# Patient Record
Sex: MALE | Race: WHITE | NOT HISPANIC OR LATINO | URBAN - METROPOLITAN AREA
[De-identification: names, ages, dates, MRNs, and addresses within clinical notes are randomized per-mention and may not be internally consistent; named-entity substitution may affect disease eponyms.]

---

## 2021-04-07 ENCOUNTER — IMMUNIZATION (OUTPATIENT)
Dept: VACCINE CLINIC | Facility: HOSPITAL | Age: 41
End: 2021-04-07

## 2021-04-07 PROCEDURE — 0001A: CPT | Performed by: INTERNAL MEDICINE

## 2021-04-07 PROCEDURE — 91300 HC SARSCOV02 VAC 30MCG/0.3ML IM: CPT | Performed by: INTERNAL MEDICINE

## 2021-04-28 ENCOUNTER — IMMUNIZATION (OUTPATIENT)
Dept: VACCINE CLINIC | Facility: HOSPITAL | Age: 41
End: 2021-04-28

## 2021-04-28 PROCEDURE — 91300 HC SARSCOV02 VAC 30MCG/0.3ML IM: CPT | Performed by: INTERNAL MEDICINE

## 2021-04-28 PROCEDURE — 0002A: CPT | Performed by: INTERNAL MEDICINE

## 2023-06-22 PROBLEM — E55.9 VITAMIN D DEFICIENCY: Status: ACTIVE | Noted: 2023-06-22

## 2023-06-22 PROBLEM — D50.8 IRON DEFICIENCY ANEMIA SECONDARY TO INADEQUATE DIETARY IRON INTAKE: Status: ACTIVE | Noted: 2023-06-22

## 2023-06-22 PROBLEM — Z91.09 ENVIRONMENTAL ALLERGIES: Status: ACTIVE | Noted: 2023-06-22

## 2023-06-22 PROBLEM — L71.9 ROSACEA: Status: ACTIVE | Noted: 2023-06-22

## 2023-08-22 ENCOUNTER — OFFICE VISIT (OUTPATIENT)
Dept: SPORTS MEDICINE | Facility: CLINIC | Age: 43
End: 2023-08-22
Payer: COMMERCIAL

## 2023-08-22 VITALS
HEIGHT: 70 IN | WEIGHT: 185.4 LBS | OXYGEN SATURATION: 97 % | RESPIRATION RATE: 12 BRPM | SYSTOLIC BLOOD PRESSURE: 118 MMHG | BODY MASS INDEX: 26.54 KG/M2 | DIASTOLIC BLOOD PRESSURE: 84 MMHG | HEART RATE: 74 BPM

## 2023-08-22 DIAGNOSIS — M25.561 CHRONIC PAIN OF RIGHT KNEE: ICD-10-CM

## 2023-08-22 DIAGNOSIS — G89.29 CHRONIC PAIN OF RIGHT KNEE: ICD-10-CM

## 2023-08-22 DIAGNOSIS — M62.559 ATROPHY OF QUADRICEPS FEMORIS MUSCLE: ICD-10-CM

## 2023-08-22 DIAGNOSIS — M79.604 RIGHT LEG PAIN: Primary | ICD-10-CM

## 2023-08-22 PROCEDURE — 99213 OFFICE O/P EST LOW 20 MIN: CPT | Performed by: FAMILY MEDICINE

## 2023-08-22 NOTE — PROGRESS NOTES
"Lopez is a 42 y.o. year old male     Chief Complaint   Patient presents with    Follow-up     Sciatic and right knee       History of Present Illness  HPI   Here to follow-up on right leg and knee pain.  Overall he feels like he is improving since his last visit with progressive strengthening.  He still has some specific pain around the right knee particularly with some stiffness and pain anteriorly with going down the step left leg first.  He also notes some posterior right knee stiffness and pain at times.  His more proximal thigh pain has nearly resolved.    Review of Systems    /84 (BP Location: Right arm, Patient Position: Sitting, Cuff Size: Adult)   Pulse 74   Resp 12   Ht 177.8 cm (70\")   Wt 84.1 kg (185 lb 6.4 oz)   SpO2 97%   BMI 26.60 kg/mý      Physical Exam    Vital signs reviewed.   General: No acute distress.      MSK Exam:  Ortho Exam  Right leg: Stable appearance.  His quadricep atrophy seems less obvious today.  There is mild patellofemoral tenderness palpation and mild lateral joint line tenderness at the knee.  Normal range of motion normal strength.  There is no ligamentous laxity but he does have some apprehension with Héctor maneuver.  He does have some pain with a dial maneuver as well.      Diagnoses and all orders for this visit:    Right leg pain    Atrophy of quadriceps femoris muscle    Chronic pain of right knee      Overall I think we can continue treating his knee pain and quadricep atrophy with progressive strengthening and stability control therapy.  We discussed some changes to his therapy regimen.  If he fails to continue to respond we could consider advanced imaging to the knee for possible underlying cartilaginous injury as there is some early arthritic change on his patellofemoral space on his x-ray from his last visit.  We could also rule out lateral meniscus pathology.  I did recommend that he check back with his physical therapist over the next couple of " weeks as well.  Follow-up with me in 6 to 8 weeks.    EMR Dragon/Transcription disclaimer:    Much of this encounter note is an electronic transcription/translation of spoken language to printed text.  The electronic translation of spoken language may permit erroneous, or at times, nonsensical words or phrases to be inadvertently transcribed.  Although I have reviewed the note for such errors some may still exist.

## 2023-09-05 ENCOUNTER — TELEMEDICINE (OUTPATIENT)
Dept: INTERNAL MEDICINE | Facility: CLINIC | Age: 43
End: 2023-09-05
Payer: COMMERCIAL

## 2023-09-05 ENCOUNTER — TELEPHONE (OUTPATIENT)
Dept: INTERNAL MEDICINE | Facility: CLINIC | Age: 43
End: 2023-09-05

## 2023-09-05 DIAGNOSIS — U07.1 COVID: Primary | ICD-10-CM

## 2023-09-05 DIAGNOSIS — R05.1 ACUTE COUGH: ICD-10-CM

## 2023-09-05 PROCEDURE — 99213 OFFICE O/P EST LOW 20 MIN: CPT | Performed by: NURSE PRACTITIONER

## 2023-09-05 RX ORDER — BENZONATATE 100 MG/1
100 CAPSULE ORAL 3 TIMES DAILY PRN
Qty: 30 CAPSULE | Refills: 0 | Status: SHIPPED | OUTPATIENT
Start: 2023-09-05

## 2023-09-05 NOTE — PROGRESS NOTES
Name: Lopez Hale  :  1980  Provider: Rocío Osborn III, NP-C     Subjective:      Chief Complaint   Patient presents with    Cough     Positive COVID         Lopez Hale is a 42 y.o. male and has scheduled an Video Visit.     Patient presents during the COVID-19 pandemic/federally declared [facility] public health emergency.   This service was conducted via [employer] .     Covid/ flu like symptoms:     Onset: 24  Thought it was sinus infection.     Home COVID test:   [x] Yes:  [] Negative   [x] Positive: yesterday   [] No    Most common symptoms include:  [x] Fever  [x] Dry cough  [x] Tiredness / fatigue     Less common symptoms:  [] Body aches and pains  [] Sore throat  [x] Diarrhea (first few days)  [] Conjunctivitis  [x] Headache  [] Loss of taste or smell  [] a rash on skin, or discoloration of fingers or toes    Serious symptoms: [x] Denies   [] Difficulty breathing or shortness of breath  [] Chest pain or pressure  [] Loss of speech of movement    Symptom onset:   [] Gradual   [x] Sudden     Symptom progression:   [] Improving  [] Worsening   [x] Unchanged     Known COVID exposure:  [] Yes  [] No  [x] Unknown     Vaccinated:   [x] Yes  [] Boosted   [] No         Location:   Provider: in office  Patient: TeleLocation: home    I have reviewed the patient's medical history in detail and updated the computerized patient record.    History reviewed. No pertinent past medical history.    Past Surgical History:   Procedure Laterality Date    KNEE CARTILAGE SURGERY Right     [facility]: due to knee issues: at that time was a     WRIST SURGERY Left     compound fracture       Family History   Adopted: Yes       Social History     Socioeconomic History    Marital status: Single   Tobacco Use    Smoking status: Every Day     Packs/day: 0.50     Types: Cigarettes    Smokeless tobacco: Never   Vaping Use    Vaping Use: Some days   Substance and Sexual  Activity    Alcohol use: Never    Drug use: Yes     Types: Marijuana    Sexual activity: Yes     Partners: Female       Most Recent Immunizations   Administered Date(s) Administered    COVID-19 (PFIZER) Purple Cap Monovalent 04/28/2021    Hepatitis A 03/13/2019    Td, Not Adsorbed 09/07/2017    Tdap 08/25/2016         Review of Systems:   Review of Systems   Respiratory:  Negative for shortness of breath.    Cardiovascular:  Negative for chest pain.       Objective:      Physical Exam:   NO Physical exam due to video visit.  On Visual and Verbal exam:    Constitution: NAD - in bed   Pulmonary: unlabored   Psych: A&O, Calm       Vital Signs:  NO Office Vitals due to video visit.    Patient provided with home vitals which include:         Requested Prescriptions     Signed Prescriptions Disp Refills    benzonatate (Tessalon Perles) 100 MG capsule 30 capsule 0     Sig: Take 1 capsule by mouth 3 (Three) Times a Day As Needed for Cough.    Nirmatrelvir&Ritonavir 300/100 (PAXLOVID) 20 x 150 MG & 10 x 100MG tablet therapy pack tablet 30 tablet 0     Sig: Take 3 tablets by mouth 2 (Two) Times a Day.     Routine medications provided by this office will also be refilled via pharmacy request.       Current Outpatient Medications:     benzonatate (Tessalon Perles) 100 MG capsule, Take 1 capsule by mouth 3 (Three) Times a Day As Needed for Cough., Disp: 30 capsule, Rfl: 0    Ferrous Sulfate (Iron) 28 MG tablet, Take  by mouth., Disp: , Rfl:     loratadine (CLARITIN) 10 MG tablet, Take 1 tablet by mouth Daily., Disp: , Rfl:     Multiple Vitamins-Minerals (Zinc) lozenge, Take  by mouth., Disp: , Rfl:     Nirmatrelvir&Ritonavir 300/100 (PAXLOVID) 20 x 150 MG & 10 x 100MG tablet therapy pack tablet, Take 3 tablets by mouth 2 (Two) Times a Day., Disp: 30 tablet, Rfl: 0    Vitamin D-Vitamin K (K2-D3 5000 PO), Take  by mouth., Disp: , Rfl:        Assessment and Plan:      Based upon patient provided history and account of medical  "problem, I suspect the patient has:     Problems Addressed this Visit    None  Visit Diagnoses       COVID    -  Primary    Relevant Medications    Nirmatrelvir&Ritonavir 300/100 (PAXLOVID) 20 x 150 MG & 10 x 100MG tablet therapy pack tablet    Acute cough        Relevant Medications    benzonatate (Tessalon Perles) 100 MG capsule          Diagnoses         Codes Comments    COVID    -  Primary ICD-10-CM: U07.1  ICD-9-CM: 079.89     Acute cough     ICD-10-CM: R05.1  ICD-9-CM: 786.2              See \"patient instructions\" for portions of the plan for treatment.     Discussed any change in Rx and discussed visit with patient.  All questions answered.      Return if symptoms worsen or fail to improve.    Rex \"Maurice\" Irena, APRN   09/05/23    Dragon disclaimer:   Much of this encounter note is an electronic transcription/translation of spoken language to printed text. The electronic translation of spoken language may permit erroneous, or at times, nonsensical words or phrases to be inadvertently transcribed; Although I have reviewed the note for such errors, some may still exist.     Additional Patient Counseling:       Patient Instructions     Symptom Treatment:     Fever/ Chills / Headache / Body Aches:    Tylenol is recommended: if you are not getting any improvement you can alternate Tylenol and Ibuprofen (Ibuprofen should always be taken with food)      You may take over-the-counter Tylenol Cold/Flu Remedy (if you do - do not take additional tylenol as this will have tylenol included)     We advise that patients stay well hydrated, particularly those patients with sustained or higher fevers, in whom insensible fluid losses may be significant.    ?Cough that is persistent, interferes with sleep, or causes discomfort can be managed with an over-the-counter cough medication.     Additionally take if checked and prescribed:   [x] Benzonatate 100 mg can be taken orally three times a day as needed.  Do not take with " a hot beverage.       Breathing exercises  Pursed lip breathing exercises:   Sitting upright or slightly reclining, relax your neck and shoulder muscles.   With your mouth closed, inhale through the nose for 2 seconds, as if smelling a flower.   Exhale slowly (for 4 seconds if possible) through pursed lips, as if blowing out birthday candles.               Repeat inhalation and exhalation cycles for 2 minutes, several times a day and              when needed.   Deep breathing exercises:   Recline in bed or on a sofa with a pillow under your head and knees. If reclining is not possible, this may be done while sitting upright.   Place one hand on your belly, the other hand on your chest.   Slowly inhale through your nose; let your lungs fill with air, allowing your belly to rise. (The hand on the belly should move more than the hand on the chest.)   Breathe out through your nose, and as you exhale, feel your belly lower.   Repeat the inhalation and exhalation cycles for 2 to 5 minutes several times a day and when needed.       Worsening:      If you have new onset of shortness of breath, worsening shortness of breath, dizziness, and mental status changes such as confusion.   GO TO THE EMERGENCY ROOM OR CALL 911.       Isolation:     Current CDC guideline recommends isolation for 5 days from symptom onset and at least 24 hours being fever-free without taking fever reducer like Tylenol or Ibuprofen.       [place] Coronavirus Hotline: 1.450.691.4007

## 2023-09-05 NOTE — PATIENT INSTRUCTIONS
Symptom Treatment:     Fever/ Chills / Headache / Body Aches:    Tylenol is recommended: if you are not getting any improvement you can alternate Tylenol and Ibuprofen (Ibuprofen should always be taken with food)      You may take over-the-counter Tylenol Cold/Flu Remedy (if you do - do not take additional tylenol as this will have tylenol included)     We advise that patients stay well hydrated, particularly those patients with sustained or higher fevers, in whom insensible fluid losses may be significant.    ?Cough that is persistent, interferes with sleep, or causes discomfort can be managed with an over-the-counter cough medication.     Additionally take if checked and prescribed:   [x] Benzonatate 100 mg can be taken orally three times a day as needed.  Do not take with a hot beverage.       Breathing exercises  Pursed lip breathing exercises:   Sitting upright or slightly reclining, relax your neck and shoulder muscles.   With your mouth closed, inhale through the nose for 2 seconds, as if smelling a flower.   Exhale slowly (for 4 seconds if possible) through pursed lips, as if blowing out birthday candles.               Repeat inhalation and exhalation cycles for 2 minutes, several times a day and              when needed.   Deep breathing exercises:   Recline in bed or on a sofa with a pillow under your head and knees. If reclining is not possible, this may be done while sitting upright.   Place one hand on your belly, the other hand on your chest.   Slowly inhale through your nose; let your lungs fill with air, allowing your belly to rise. (The hand on the belly should move more than the hand on the chest.)   Breathe out through your nose, and as you exhale, feel your belly lower.   Repeat the inhalation and exhalation cycles for 2 to 5 minutes several times a day and when needed.       Worsening:      If you have new onset of shortness of breath, worsening shortness of breath, dizziness, and mental  status changes such as confusion.   GO TO THE EMERGENCY ROOM OR CALL 911.       Isolation:     Current CDC guideline recommends isolation for 5 days from symptom onset and at least 24 hours being fever-free without taking fever reducer like Tylenol or Ibuprofen.       [place] Coronavirus Hotline: 1.286.356.4573

## 2023-09-05 NOTE — TELEPHONE ENCOUNTER
Caller: Lopez Hale    Relationship: Self    Best call back number: [phone]     What medication are you requesting: SOMETHING FOR COVID    What are your current symptoms:COUGH,BODY ACHES, DIARRHEA, SINUS CONGESTION,FEVER AND CHILLS    How long have you been experiencing symptoms: 5 DAYS    Have you had these symptoms before:    [x] Yes  [] No    Have you been treated for these symptoms before:   [] Yes  [x] No    If a prescription is needed, what is your preferred pharmacy and phone number: [address] [place], [place] - [address] [facility] [phone] [facility] [phone] FX     Additional notes:PATIENT STARTED FEELING SYMPTOMS ON 9/1/23 AND THEN HE TESTED POSITIVE FOR COVID 19 ON 9/4/23 WITH A HOME TEST.

## 2025-05-14 ENCOUNTER — OFFICE VISIT (OUTPATIENT)
Dept: INTERNAL MEDICINE | Facility: CLINIC | Age: 45
End: 2025-05-14
Payer: COMMERCIAL

## 2025-05-14 ENCOUNTER — HOSPITAL ENCOUNTER (OUTPATIENT)
Facility: HOSPITAL | Age: 45
Discharge: HOME | End: 2025-05-14
Admitting: STUDENT IN AN ORGANIZED HEALTH CARE EDUCATION/TRAINING PROGRAM
Payer: COMMERCIAL

## 2025-05-14 VITALS
SYSTOLIC BLOOD PRESSURE: 122 MMHG | RESPIRATION RATE: 18 BRPM | OXYGEN SATURATION: 96 % | HEART RATE: 75 BPM | HEIGHT: 70 IN | DIASTOLIC BLOOD PRESSURE: 80 MMHG | BODY MASS INDEX: 24.94 KG/M2 | TEMPERATURE: 98.1 F | WEIGHT: 174.2 LBS

## 2025-05-14 DIAGNOSIS — J01.10 SUBACUTE FRONTAL SINUSITIS: Primary | ICD-10-CM

## 2025-05-14 DIAGNOSIS — J06.9 UPPER RESPIRATORY TRACT INFECTION, UNSPECIFIED TYPE: ICD-10-CM

## 2025-05-14 PROCEDURE — 99213 OFFICE O/P EST LOW 20 MIN: CPT | Performed by: STUDENT IN AN ORGANIZED HEALTH CARE EDUCATION/TRAINING PROGRAM

## 2025-05-14 PROCEDURE — 71046 X-RAY EXAM CHEST 2 VIEWS: CPT

## 2025-05-14 RX ORDER — AZITHROMYCIN 250 MG/1
TABLET, FILM COATED ORAL
Qty: 6 TABLET | Refills: 0 | Status: CANCELLED | OUTPATIENT
Start: 2025-05-14 | End: 2025-05-19

## 2025-05-14 RX ORDER — METHYLPREDNISOLONE 4 MG/1
TABLET ORAL
Qty: 21 TABLET | Refills: 0 | Status: SHIPPED | OUTPATIENT
Start: 2025-05-14

## 2025-05-14 NOTE — PROGRESS NOTES
"Chief Complaint  Cough (STILL NOT FEELING WELL, CANT SMELL OR TASTE/ CONGESTION/ UPPER RESPIRATORY/X2 weeks /OTC allegra, allergy med 10mg, amoxicillin, nasal spray not any better/Covid negative 5/9)    Subjective        Lopez Hale presents to [facility]  History of Present Illness  This is a 44-year-old male here is here for follow-up after recent evaluation in urgent care for cough and viral URI 1 week ago.  Has been taking over-the-counter Allegra allergy medicine and was given a course of amoxicillin nasal spray and still has not been feeling better.  COVID testing was negative on May 9.  He is wondering about steroids.  Tomorrow is his last day of amoxicillin.    Objective   Vital Signs:  /80 (BP Location: Right arm, Patient Position: Sitting, Cuff Size: Adult)   Pulse 75   Temp 98.1 °F (36.7 °C) (Oral)   Resp 18   Ht 177.8 cm (70\")   Wt 79 kg (174 lb 3.2 oz)   SpO2 96%   BMI 25.00 kg/m²   Estimated body mass index is 25 kg/m² as calculated from the following:    Height as of this encounter: 177.8 cm (70\").    Weight as of this encounter: 79 kg (174 lb 3.2 oz).          Physical Exam  Vitals reviewed.   Constitutional:       General: He is not in acute distress.     Appearance: He is not toxic-appearing.   HENT:      Nose: Congestion and rhinorrhea present.   Cardiovascular:      Rate and Rhythm: Normal rate.   Pulmonary:      Effort: Pulmonary effort is normal. No respiratory distress.      Breath sounds: No stridor. No wheezing.   Neurological:      Mental Status: He is alert.   Psychiatric:         Mood and Affect: Mood normal.         Thought Content: Thought content normal.        Result Review :                Assessment and Plan   Diagnoses and all orders for this visit:    1. Subacute frontal sinusitis (Primary)  -     methylPREDNISolone (MEDROL) 4 MG dose pack; Take as directed on package instructions.  Dispense: 21 tablet; Refill: 0    2. Upper " respiratory tract infection, unspecified type  -     XR Chest PA & Lateral; Future      reviewed with patient no need for additional antibiotics at this time but reasonable to obtain chest x-ray given persistent symptoms.  Will review this and advise.  To follow-up with PCP with any new worsening or persistent symptoms.  Felt comfortable with the plan of care for imaging and steroids.       Follow Up   Return if symptoms worsen or fail to improve.  Patient was given instructions and counseling regarding his condition or for health maintenance advice. Please see specific information pulled into the AVS if appropriate.